# Patient Record
Sex: MALE | Race: WHITE | NOT HISPANIC OR LATINO | Employment: OTHER | URBAN - METROPOLITAN AREA
[De-identification: names, ages, dates, MRNs, and addresses within clinical notes are randomized per-mention and may not be internally consistent; named-entity substitution may affect disease eponyms.]

---

## 2023-09-06 ENCOUNTER — HOSPITAL ENCOUNTER (EMERGENCY)
Facility: CLINIC | Age: 28
Discharge: HOME | End: 2023-09-06
Attending: EMERGENCY MEDICINE | Admitting: EMERGENCY MEDICINE
Payer: COMMERCIAL

## 2023-09-06 ENCOUNTER — APPOINTMENT (OUTPATIENT)
Dept: GENERAL RADIOLOGY | Facility: CLINIC | Age: 28
End: 2023-09-06
Attending: EMERGENCY MEDICINE
Payer: COMMERCIAL

## 2023-09-06 VITALS
HEART RATE: 94 BPM | TEMPERATURE: 98.1 F | WEIGHT: 213.5 LBS | DIASTOLIC BLOOD PRESSURE: 80 MMHG | RESPIRATION RATE: 16 BRPM | SYSTOLIC BLOOD PRESSURE: 117 MMHG | OXYGEN SATURATION: 100 %

## 2023-09-06 DIAGNOSIS — J20.9 ACUTE BRONCHITIS, UNSPECIFIED ORGANISM: ICD-10-CM

## 2023-09-06 PROCEDURE — 71045 X-RAY EXAM CHEST 1 VIEW: CPT

## 2023-09-06 PROCEDURE — 99283 EMERGENCY DEPT VISIT LOW MDM: CPT | Performed by: EMERGENCY MEDICINE

## 2023-09-06 PROCEDURE — 99283 EMERGENCY DEPT VISIT LOW MDM: CPT | Mod: 25 | Performed by: EMERGENCY MEDICINE

## 2023-09-06 ASSESSMENT — ACTIVITIES OF DAILY LIVING (ADL): ADLS_ACUITY_SCORE: 35

## 2023-09-06 NOTE — LETTER
September 6, 2023      To Whom It May Concern:      Rufus Cordova was seen in our Emergency Department today, 09/06/23.   He may return to work tomorrow.       Sincerely,        Adalberto Mojica MD

## 2023-09-06 NOTE — ED PROVIDER NOTES
History     Chief Complaint   Patient presents with    Cough     HPI  Rufus Cordova is a 28 year old male who presents with a cough.  This began 1 week ago.  No fever.  Feels some congestion.  No dyspnea.  No history of chronic lung disease.  Is a smoker    Allergies:  No Known Allergies    Problem List:    There are no problems to display for this patient.       Past Medical History:    No past medical history on file.    Past Surgical History:    No past surgical history on file.    Family History:    No family history on file.    Social History:  Marital Status:  Single [1]        Medications:    No current outpatient medications on file.        Review of Systems  All other systems are reviewed and are negative    Physical Exam   BP: 117/80  Pulse: 94  Temp: 98.1  F (36.7  C)  Resp: 16  Weight: 96.8 kg (213 lb 8 oz)  SpO2: 100 %      Physical Exam  Vitals and nursing note reviewed.   Constitutional:       General: He is not in acute distress.     Appearance: He is well-developed. He is not diaphoretic.   HENT:      Head: Normocephalic and atraumatic.      Nose: Nose normal.      Mouth/Throat:      Mouth: Mucous membranes are moist.      Pharynx: Oropharynx is clear.   Eyes:      General: No scleral icterus.        Right eye: No discharge.         Left eye: No discharge.      Conjunctiva/sclera: Conjunctivae normal.   Cardiovascular:      Rate and Rhythm: Normal rate and regular rhythm.      Heart sounds: Normal heart sounds. No murmur heard.  Pulmonary:      Effort: Pulmonary effort is normal. No respiratory distress.      Breath sounds: Normal breath sounds. No stridor.   Abdominal:      General: There is no distension.      Palpations: Abdomen is soft.      Tenderness: There is no abdominal tenderness. There is no guarding or rebound.   Musculoskeletal:         General: Normal range of motion.      Cervical back: Normal range of motion and neck supple.   Skin:     General: Skin is warm and dry.       Coloration: Skin is not pale.      Findings: No erythema or rash.   Neurological:      General: No focal deficit present.      Mental Status: He is alert.      Cranial Nerves: No cranial nerve deficit.      Motor: No abnormal muscle tone.   Psychiatric:         Mood and Affect: Mood normal.         ED Course                 Procedures              Results for orders placed or performed during the hospital encounter of 09/06/23 (from the past 24 hour(s))   XR Chest Port 1 View    Narrative    XR CHEST PORT 1 VIEW 9/6/2023 3:14 PM    HISTORY: cough    COMPARISON: None.      Impression    IMPRESSION: No infiltrate, pleural effusion or pneumothorax. Small  calcified granuloma in the mid right lung. Normal heart size.    BRISEIDA PAULINO MD         SYSTEM ID:  [id number]       Medications - No data to display    Assessments & Plan (with Medical Decision Making)  28-year-old male with a cough over the last week.  Chest x-ray negative.  Appears viral.  Symptomatic care advised,     I have reviewed the nursing notes.    I have reviewed the findings, diagnosis, plan and need for follow up with the patient.          New Prescriptions    No medications on file       Final diagnoses:   Acute bronchitis, unspecified organism       9/6/2023   [facility] EMERGENCY DEPT       Adalberto Mojica MD  09/06/23 2312

## 2023-09-06 NOTE — ED TRIAGE NOTES
Pt presents with cough, runny nose chest fullness and chest pian with inspiration. Started one week ago. Slight headache.      Triage Assessment       Row Name 09/06/23 5785       Triage Assessment (Adult)    Airway WDL WDL       Respiratory WDL    Respiratory WDL X  Cough       Skin Circulation/Temperature WDL    Skin Circulation/Temperature WDL WDL       Cardiac WDL    Cardiac WDL X  Chest fullness.       Peripheral/Neurovascular WDL    Peripheral Neurovascular WDL WDL       Cognitive/Neuro/Behavioral WDL    Cognitive/Neuro/Behavioral WDL WDL

## 2024-04-29 ENCOUNTER — APPOINTMENT (OUTPATIENT)
Dept: GENERAL RADIOLOGY | Facility: CLINIC | Age: 29
End: 2024-04-29
Attending: STUDENT IN AN ORGANIZED HEALTH CARE EDUCATION/TRAINING PROGRAM

## 2024-04-29 ENCOUNTER — HOSPITAL ENCOUNTER (EMERGENCY)
Facility: CLINIC | Age: 29
Discharge: HOME | End: 2024-04-29
Attending: STUDENT IN AN ORGANIZED HEALTH CARE EDUCATION/TRAINING PROGRAM | Admitting: STUDENT IN AN ORGANIZED HEALTH CARE EDUCATION/TRAINING PROGRAM

## 2024-04-29 VITALS
SYSTOLIC BLOOD PRESSURE: 104 MMHG | OXYGEN SATURATION: 95 % | RESPIRATION RATE: 22 BRPM | DIASTOLIC BLOOD PRESSURE: 70 MMHG | HEART RATE: 102 BPM | TEMPERATURE: 97.5 F

## 2024-04-29 DIAGNOSIS — R10.30 LOWER ABDOMINAL PAIN: ICD-10-CM

## 2024-04-29 PROCEDURE — 99283 EMERGENCY DEPT VISIT LOW MDM: CPT | Performed by: STUDENT IN AN ORGANIZED HEALTH CARE EDUCATION/TRAINING PROGRAM

## 2024-04-29 PROCEDURE — 74019 RADEX ABDOMEN 2 VIEWS: CPT

## 2024-04-29 ASSESSMENT — COLUMBIA-SUICIDE SEVERITY RATING SCALE - C-SSRS
1. IN THE PAST MONTH, HAVE YOU WISHED YOU WERE DEAD OR WISHED YOU COULD GO TO SLEEP AND NOT WAKE UP?: NO
6. HAVE YOU EVER DONE ANYTHING, STARTED TO DO ANYTHING, OR PREPARED TO DO ANYTHING TO END YOUR LIFE?: NO
2. HAVE YOU ACTUALLY HAD ANY THOUGHTS OF KILLING YOURSELF IN THE PAST MONTH?: NO

## 2024-04-29 ASSESSMENT — ACTIVITIES OF DAILY LIVING (ADL)
ADLS_ACUITY_SCORE: 35
ADLS_ACUITY_SCORE: 35

## 2024-04-29 NOTE — Clinical Note
Rufus Cordova was seen and treated in our emergency department on 4/29/2024.  He may return to work on 05/01/2024.       If you have any questions or concerns, please don't hesitate to call.      Robinson Felipe MD

## 2024-04-30 NOTE — ED NOTES
PT states that he don't have a ride and cannot wait for the reports. PT states that his co worker who brought him here is the one waiting outside.

## 2024-04-30 NOTE — DISCHARGE INSTRUCTIONS
The cause of your pain is still very unclear.  If I had to guess, I think you are probably dealing with constipation and gas.  We discussed performing a few different tests including blood work and urine testing today, but since he feels so much better, I do think it is reasonable to start with the x-ray alone.  Results of the study are still pending.  I will contact you with any concerning results.  I think it would be reasonable to start using MiraLAX to help with constipation.  Start with a capful daily and increase by a capful each day until you are having regular/soft bowel movements.  Follow-up with your primary care doctor for recheck.  Return to the emergency department immediately in the meantime for return/worsened pain, nausea/vomiting, fevers, other new or concerning symptoms.

## 2024-04-30 NOTE — ED PROVIDER NOTES
History     Chief Complaint   Patient presents with    Abdominal Pain     Abdominal pain that started today     HPI  Rufus Cordova is a 29 year old male with no relevant medical history who presents for evaluation of abdominal pain.  Patient reports progressively worsening left lower quadrant abdominal pain since this morning.  This started without obvious injury or strain.  He did note having a very large/hard bowel movement about a week ago.  There was a tiny amount of blood on the tissue paper at that time, but none since.  He has had a few small bowel movements since then, but still feels backed up.  Patient otherwise feels well, denies any associated fever, chills, nausea or vomiting, other changes in bowel or urinary habits, other complaints today.    Allergies:  No Known Allergies    Problem List:    There are no problems to display for this patient.     Past Medical History:    Denies.    Past Surgical History:    No past surgical history on file.    Family History:    No family history on file.    Social History:  Marital Status:  Single [1]        Medications:    No current outpatient medications on file.      Review of Systems   All other systems reviewed and are negative.  See HPI.    Physical Exam   BP: 106/70  Pulse: 102  Temp: 97.5  F (36.4  C)  Resp: 22  SpO2: 94 %    Physical Exam  Vitals and nursing note reviewed.   Constitutional:       General: He is not in acute distress.     Appearance: Normal appearance. He is not toxic-appearing.      Comments: Anxious, otherwise nontoxic.  Sitting comfortably on exam bed.   HENT:      Head: Atraumatic.   Eyes:      General: No scleral icterus.     Conjunctiva/sclera: Conjunctivae normal.   Cardiovascular:      Rate and Rhythm: Normal rate.      Heart sounds: Normal heart sounds.   Pulmonary:      Effort: Pulmonary effort is normal. No respiratory distress.      Breath sounds: Normal breath sounds.   Abdominal:      General: Abdomen is flat. There is no  distension.      Palpations: Abdomen is soft.      Tenderness: There is abdominal tenderness in the suprapubic area.      Comments: Patient had minimal if any suprapubic/LLQ tenderness with no rebound or guarding.  No CVA tenderness.   Musculoskeletal:         General: No deformity.      Cervical back: Neck supple.   Skin:     General: Skin is warm.      Capillary Refill: Capillary refill takes less than 2 seconds.      Coloration: Skin is not pale.   Neurological:      General: No focal deficit present.      Mental Status: He is alert and oriented to person, place, and time.   Psychiatric:         Mood and Affect: Mood is anxious.         ED Course        Procedures              Results for orders placed or performed during the hospital encounter of 04/29/24 (from the past 24 hour(s))   XR Abdomen 2 Views    Narrative    EXAM: XR ABDOMEN 2 VIEWS  LOCATION: [facility]  DATE: 4/29/2024    INDICATION: Left lower quadrant suprapubic pain, relieved with passing gas  COMPARISON: None.      Impression    IMPRESSION: Negative abdomen. Bowel gas pattern is normal. Nothing for obstruction or free air. No evidence for renal stones.       Medications - No data to display    Assessments & Plan (with Medical Decision Making)     I have reviewed the nursing notes.    I have reviewed the findings, diagnosis, plan and need for follow up with the patient.  Medical Decision Making  Rufus Cordova is a 29 year old male with no relevant medical history who presents for evaluation of abdominal pain.  Patient was slightly tachycardic but also quite anxious appearing.  His pain had improved dramatically after passing gas shortly after arrival and he was essentially nontender on exam.  Since he was in severe pain earlier and still had some mild suprapubic/left lower quadrant tenderness, I did recommend obtaining at least a urinalysis and considering some blood work.  However, since he felt so significantly  improved he was only interested in pursuing an x-ray due to history of constipation and passing a very large/hard bowel movement last week.  Even before the x-ray had resulted, patient requested discharge paperwork because he felt back to baseline and his ride was here.  The x-ray later returned without any acute findings.  Again discussed that his symptoms are probably due to gas, but that it is very difficult to rule out other causes without additional testing.  He expressed understanding of these limitations and will continue to monitor symptoms very closely at home.  I think it would be reasonable to start daily MiraLAX to help with more frequent/softer bowel movements.  Advised PCP follow-up and he agrees to return sooner for any new or acutely worsening symptoms.    There are no discharge medications for this patient.      Final diagnoses:   Lower abdominal pain       4/29/2024   [facility] EMERGENCY DEPT       Robinson Felipe MD  04/30/24 0209

## 2024-04-30 NOTE — ED TRIAGE NOTES
Patient presents with left side abdominal pain that started today and has worsened since 2030 tonight. Denies nausea, vomiting, diarrhea.      Triage Assessment (Adult)       Row Name 04/29/24 2200          Triage Assessment    Airway WDL WDL        Respiratory WDL    Respiratory WDL WDL        Cardiac WDL    Cardiac WDL X;rhythm     Pulse Rate & Regularity tachycardic        Peripheral/Neurovascular WDL    Peripheral Neurovascular WDL WDL        Cognitive/Neuro/Behavioral WDL    Cognitive/Neuro/Behavioral WDL WDL

## 2024-08-26 ENCOUNTER — TELEPHONE (OUTPATIENT)
Dept: FAMILY MEDICINE | Facility: OTHER | Age: 29
End: 2024-08-26

## 2024-08-26 ENCOUNTER — HOSPITAL ENCOUNTER (EMERGENCY)
Facility: CLINIC | Age: 29
Discharge: HOME | End: 2024-08-26
Attending: FAMILY MEDICINE | Admitting: FAMILY MEDICINE

## 2024-08-26 ENCOUNTER — APPOINTMENT (OUTPATIENT)
Dept: GENERAL RADIOLOGY | Facility: CLINIC | Age: 29
End: 2024-08-26
Attending: FAMILY MEDICINE

## 2024-08-26 VITALS
WEIGHT: 165 LBS | OXYGEN SATURATION: 99 % | BODY MASS INDEX: 28.17 KG/M2 | TEMPERATURE: 98.2 F | DIASTOLIC BLOOD PRESSURE: 67 MMHG | SYSTOLIC BLOOD PRESSURE: 110 MMHG | HEIGHT: 64 IN | RESPIRATION RATE: 18 BRPM | HEART RATE: 80 BPM

## 2024-08-26 DIAGNOSIS — S83.92XA SPRAIN OF LEFT KNEE, UNSPECIFIED LIGAMENT, INITIAL ENCOUNTER: ICD-10-CM

## 2024-08-26 PROCEDURE — 73562 X-RAY EXAM OF KNEE 3: CPT | Mod: LT

## 2024-08-26 PROCEDURE — 99284 EMERGENCY DEPT VISIT MOD MDM: CPT | Mod: 25 | Performed by: FAMILY MEDICINE

## 2024-08-26 PROCEDURE — 250N000011 HC RX IP 250 OP 636: Performed by: FAMILY MEDICINE

## 2024-08-26 PROCEDURE — 96372 THER/PROPH/DIAG INJ SC/IM: CPT | Performed by: FAMILY MEDICINE

## 2024-08-26 PROCEDURE — 99284 EMERGENCY DEPT VISIT MOD MDM: CPT | Performed by: FAMILY MEDICINE

## 2024-08-26 RX ORDER — KETOROLAC TROMETHAMINE 30 MG/ML
60 INJECTION, SOLUTION INTRAMUSCULAR; INTRAVENOUS ONCE
Status: COMPLETED | OUTPATIENT
Start: 2024-08-26 | End: 2024-08-26

## 2024-08-26 RX ADMIN — KETOROLAC TROMETHAMINE 60 MG: 30 INJECTION, SOLUTION INTRAMUSCULAR at 01:26

## 2024-08-26 ASSESSMENT — ACTIVITIES OF DAILY LIVING (ADL): ADLS_ACUITY_SCORE: 37

## 2024-08-26 NOTE — ED TRIAGE NOTES
"Pt states \"tweaked\" his left knee tonight, by hyperextending it. Denies any falls or other trauma to precipitate this. Hx of ACL tear and surgery 15+ years ago.     Triage Assessment (Adult)       Row Name 08/26/24 0115          Triage Assessment    Airway WDL WDL        Respiratory WDL    Respiratory WDL WDL        Skin Circulation/Temperature WDL    Skin Circulation/Temperature WDL WDL        Cardiac WDL    Cardiac WDL WDL        Peripheral/Neurovascular WDL    Peripheral Neurovascular WDL WDL        Cognitive/Neuro/Behavioral WDL    Cognitive/Neuro/Behavioral WDL WDL                     "

## 2024-08-26 NOTE — TELEPHONE ENCOUNTER
Patient calling as he is frustrated he left the ED without pain medications. He tore his ACL and needs something for pain. Informed him that there is noting we can do here. No mention of pain medication in ED note. Patient will go back to ED if he needs pain meds  Ananya Hairston RN on 8/26/2024 at 4:55 PM

## 2024-08-26 NOTE — ED PROVIDER NOTES
"  History     Chief Complaint   Patient presents with    Knee Pain     HPI  Rufus Cordova is a 29 year old male who presents with left knee pain.  Patient states he was walking when his knee just kind of shifted and had a pop.  This happened about 4 hours ago and since then has been having worsening pain.  Patient's had a previous ACL tear and repair.  States like after the surgery it tore again but never had it repaired again.  His knee seems to pop in and out from time to time.  Tonight is just worse.  Nothing seems to make the pain better or worse.  There was no trauma tonight.    Allergies:  No Known Allergies    Problem List:    There are no problems to display for this patient.       Past Medical History:    No past medical history on file.    Past Surgical History:    No past surgical history on file.    Family History:    No family history on file.    Social History:  Marital Status:  Single [1]        Medications:    No current outpatient medications on file.        Review of Systems   All other systems reviewed and are negative.      Physical Exam   BP: 110/67  Pulse: 80  Temp: 98.2  F (36.8  C)  Resp: 18  Height: 162.6 cm (5' 4\")  Weight: 74.8 kg (165 lb)  SpO2: 99 %      Physical Exam  Vitals and nursing note reviewed.   Constitutional:       General: He is not in acute distress.     Appearance: Normal appearance. He is not ill-appearing.   Cardiovascular:      Pulses: Normal pulses.   Musculoskeletal:      Left knee: No swelling, deformity, effusion, erythema, ecchymosis or lacerations. Decreased range of motion. Tenderness present. Normal alignment and normal meniscus.   Skin:     General: Skin is warm and dry.      Capillary Refill: Capillary refill takes less than 2 seconds.   Neurological:      Mental Status: He is alert.         ED Course        Procedures                Results for orders placed or performed during the hospital encounter of 08/26/24 (from the past 24 hour(s))   XR Knee Left 3 " Views    Narrative    EXAM: LEFT KNEE 4 VIEWS  LOCATION: [facility]  DATE/TIME: 8/26/2024 1:52 AM CDT    INDICATION: Fall. Twisting injury.  COMPARISON: None.      Impression    IMPRESSION:   1. No visualized acute fracture or malalignment of the left knee.  2. Surgical hardware in the distal femur and proximal tibia, likely related to prior ligament repair.   3. No knee joint effusion.       Medications   ketorolac (TORADOL) injection 60 mg (60 mg Intramuscular $Given 8/26/24 0126)     X-ray of the knee was unremarkable.  With patient having an unstable knee, patient certainly could have some type of ligamentous injury or cartilage injury.  Recommend doing an Ace wrap on the knee, patient did not tolerate a knee immobilizer as it was more painful to keep his knee straight.  Patient was given crutches to use as needed.  Referral was placed to orthopedics.  Patient will be discharged at this time.    Assessments & Plan (with Medical Decision Making)  Left knee pain     I have reviewed the nursing notes.    I have reviewed the findings, diagnosis, plan and need for follow up with the patient.      There are no discharge medications for this patient.      Final diagnoses:   Sprain of left knee, unspecified ligament, initial encounter       8/26/2024   [facility] EMERGENCY DEPT       Darian Robins MD  08/26/24 2615

## 2024-08-29 ENCOUNTER — HOSPITAL ENCOUNTER (EMERGENCY)
Facility: CLINIC | Age: 29
Discharge: HOME | End: 2024-08-29
Attending: EMERGENCY MEDICINE | Admitting: EMERGENCY MEDICINE

## 2024-08-29 VITALS
DIASTOLIC BLOOD PRESSURE: 82 MMHG | TEMPERATURE: 98.4 F | HEART RATE: 112 BPM | BODY MASS INDEX: 28.32 KG/M2 | OXYGEN SATURATION: 100 % | RESPIRATION RATE: 18 BRPM | SYSTOLIC BLOOD PRESSURE: 122 MMHG | WEIGHT: 165 LBS

## 2024-08-29 DIAGNOSIS — S89.92XD LEFT KNEE INJURY, SUBSEQUENT ENCOUNTER: ICD-10-CM

## 2024-08-29 PROCEDURE — 99283 EMERGENCY DEPT VISIT LOW MDM: CPT | Performed by: EMERGENCY MEDICINE

## 2024-08-29 RX ORDER — HYDROCODONE BITARTRATE AND ACETAMINOPHEN 5; 325 MG/1; MG/1
1 TABLET ORAL EVERY 6 HOURS PRN
Qty: 12 TABLET | Refills: 0 | Status: SHIPPED | OUTPATIENT
Start: 2024-08-29 | End: 2024-09-01

## 2024-08-29 ASSESSMENT — ACTIVITIES OF DAILY LIVING (ADL): ADLS_ACUITY_SCORE: 33

## 2024-08-29 ASSESSMENT — COLUMBIA-SUICIDE SEVERITY RATING SCALE - C-SSRS
6. HAVE YOU EVER DONE ANYTHING, STARTED TO DO ANYTHING, OR PREPARED TO DO ANYTHING TO END YOUR LIFE?: NO
1. IN THE PAST MONTH, HAVE YOU WISHED YOU WERE DEAD OR WISHED YOU COULD GO TO SLEEP AND NOT WAKE UP?: NO
2. HAVE YOU ACTUALLY HAD ANY THOUGHTS OF KILLING YOURSELF IN THE PAST MONTH?: NO

## 2024-08-29 NOTE — ED TRIAGE NOTES
Pt here for pain medications for his knee pain         Triage Assessment (Adult)       Row Name 08/29/24 1812          Triage Assessment    Airway WDL WDL        Respiratory WDL    Respiratory WDL WDL

## 2024-08-30 NOTE — DISCHARGE INSTRUCTIONS
No driving if taking any pain pills.  Use caution with them as it can make you dizzy.  Take them as needed for severe pain and to help you sleep.  Follow-up with orthopedics.  I hope you get this taken care of quickly.  It was a pleasure to meet you.

## 2024-08-30 NOTE — ED PROVIDER NOTES
History     Chief Complaint   Patient presents with    Knee Pain     HPI  Rufus Cordova is a 29 year old male who presents to the emergency department secondary to wanting pain pills.  He was seen here a couple of days ago after a left knee injury.  He previously had his ACL repaired in the 10th grade and that he injured it again and it felt the same.  He has had a lot of swelling throbbing discomfort and pain making it difficult to sleep.  He has been using ibuprofen and Tylenol which does not cut it very well for getting rest at nighttime.  He is planning on following up with orthopedics but cannot get in for 2 to 3 weeks.  He has been elevating and icing is much as possible.  He does not want something as strong as oxycodone.  He has tolerated hydrocodone in the past.  He has not had any adverse effects with it.    Allergies:  No Known Allergies    Problem List:    There are no problems to display for this patient.       Past Medical History:    No past medical history on file.    Past Surgical History:    No past surgical history on file.    Family History:    No family history on file.    Social History:  Marital Status:  Single [1]        Medications:    HYDROcodone-acetaminophen (NORCO) 5-325 MG tablet          Review of Systems   All other systems reviewed and are negative.      Physical Exam   BP: 122/82  Pulse: 112  Temp: 98.4  F (36.9  C)  Resp: 18  Weight: 74.8 kg (165 lb)  SpO2: 100 %      Physical Exam  Vitals and nursing note reviewed.   Constitutional:       General: He is not in acute distress.     Appearance: He is well-developed. He is not diaphoretic.   HENT:      Head: Normocephalic and atraumatic.      Nose: Nose normal. No congestion.      Mouth/Throat:      Mouth: Mucous membranes are moist.   Eyes:      General: No scleral icterus.     Extraocular Movements: Extraocular movements intact.      Conjunctiva/sclera: Conjunctivae normal.   Cardiovascular:      Rate and Rhythm: Normal rate.    Pulmonary:      Effort: Pulmonary effort is normal.   Abdominal:      General: Abdomen is flat.   Musculoskeletal:      Cervical back: Normal range of motion and neck supple.      Comments: Patient's left knee was wrapped in Ace wrap and.  Swollen I did not palpate the knee.   Lymphadenopathy:      Cervical: No cervical adenopathy.   Skin:     General: Skin is warm and dry.      Findings: No rash.   Neurological:      General: No focal deficit present.      Mental Status: He is alert.         ED Course        Procedures             No results found for this or any previous visit (from the past 24 hour(s)).    Medications - No data to display    Assessments & Plan (with Medical Decision Making)  Left knee injury with ongoing pain and difficulty sleeping.  Patient has a history of ACL injury and this feels the same.  He has not used any narcotics in the past but does not want something heavy.  He was comfortable trying Norco.  He has tolerated that well in the past.  A prescription was sent to the pharmacy.  Patient was advised using these medications and advised on follow-up.  Return to ER precautions and follow-up precautions discussed.  All questions answered prior to discharge.     I have reviewed the nursing notes.    I have reviewed the findings, diagnosis, plan and need for follow up with the patient.                Discharge Medication List as of 8/29/2024  7:20 PM        START taking these medications    Details   HYDROcodone-acetaminophen (NORCO) 5-325 MG tablet Take 1 tablet by mouth every 6 hours as needed for severe pain., Disp-12 tablet, R-0, E-Prescribe             Final diagnoses:   Left knee injury, subsequent encounter       8/29/2024   [facility] EMERGENCY DEPT       Joni Rasmussen MD  08/29/24 2438

## 2024-09-09 ENCOUNTER — APPOINTMENT (OUTPATIENT)
Dept: MRI IMAGING | Facility: CLINIC | Age: 29
End: 2024-09-09
Attending: STUDENT IN AN ORGANIZED HEALTH CARE EDUCATION/TRAINING PROGRAM

## 2024-09-09 ENCOUNTER — APPOINTMENT (OUTPATIENT)
Dept: GENERAL RADIOLOGY | Facility: CLINIC | Age: 29
End: 2024-09-09
Attending: STUDENT IN AN ORGANIZED HEALTH CARE EDUCATION/TRAINING PROGRAM

## 2024-09-09 ENCOUNTER — HOSPITAL ENCOUNTER (EMERGENCY)
Facility: CLINIC | Age: 29
Discharge: HOME | End: 2024-09-09
Attending: STUDENT IN AN ORGANIZED HEALTH CARE EDUCATION/TRAINING PROGRAM | Admitting: STUDENT IN AN ORGANIZED HEALTH CARE EDUCATION/TRAINING PROGRAM

## 2024-09-09 VITALS
SYSTOLIC BLOOD PRESSURE: 105 MMHG | DIASTOLIC BLOOD PRESSURE: 72 MMHG | HEART RATE: 82 BPM | BODY MASS INDEX: 26.8 KG/M2 | WEIGHT: 157 LBS | OXYGEN SATURATION: 98 % | HEIGHT: 64 IN | RESPIRATION RATE: 18 BRPM | TEMPERATURE: 97.8 F

## 2024-09-09 DIAGNOSIS — M25.562 ACUTE PAIN OF LEFT KNEE: ICD-10-CM

## 2024-09-09 DIAGNOSIS — S83.512A RUPTURE OF ANTERIOR CRUCIATE LIGAMENT OF LEFT KNEE, INITIAL ENCOUNTER: ICD-10-CM

## 2024-09-09 LAB
ALBUMIN SERPL BCG-MCNC: 3.5 G/DL (ref 3.5–5.2)
ALP SERPL-CCNC: 78 U/L (ref 40–150)
ALT SERPL W P-5'-P-CCNC: 19 U/L (ref 0–70)
ANION GAP SERPL CALCULATED.3IONS-SCNC: 9 MMOL/L (ref 7–15)
AST SERPL W P-5'-P-CCNC: 20 U/L (ref 0–45)
BASOPHILS # BLD AUTO: 0.1 10E3/UL (ref 0–0.2)
BASOPHILS NFR BLD AUTO: 1 %
BILIRUB SERPL-MCNC: 0.3 MG/DL
BUN SERPL-MCNC: 10.4 MG/DL (ref 6–20)
CALCIUM SERPL-MCNC: 8.3 MG/DL (ref 8.8–10.4)
CHLORIDE SERPL-SCNC: 102 MMOL/L (ref 98–107)
CREAT SERPL-MCNC: 0.83 MG/DL (ref 0.67–1.17)
CRP SERPL-MCNC: 3.2 MG/L
D DIMER PPP FEU-MCNC: <0.27 UG/ML FEU (ref 0–0.5)
EGFRCR SERPLBLD CKD-EPI 2021: >90 ML/MIN/1.73M2
EOSINOPHIL # BLD AUTO: 0.3 10E3/UL (ref 0–0.7)
EOSINOPHIL NFR BLD AUTO: 2 %
ERYTHROCYTE [DISTWIDTH] IN BLOOD BY AUTOMATED COUNT: 13 % (ref 10–15)
ERYTHROCYTE [SEDIMENTATION RATE] IN BLOOD BY WESTERGREN METHOD: 2 MM/HR (ref 0–15)
GLUCOSE SERPL-MCNC: 90 MG/DL (ref 70–99)
HCO3 SERPL-SCNC: 26 MMOL/L (ref 22–29)
HCT VFR BLD AUTO: 42.6 % (ref 40–53)
HGB BLD-MCNC: 14.6 G/DL (ref 13.3–17.7)
IMM GRANULOCYTES # BLD: 0.2 10E3/UL
IMM GRANULOCYTES NFR BLD: 1 %
LYMPHOCYTES # BLD AUTO: 1.6 10E3/UL (ref 0.8–5.3)
LYMPHOCYTES NFR BLD AUTO: 12 %
MCH RBC QN AUTO: 32 PG (ref 26.5–33)
MCHC RBC AUTO-ENTMCNC: 34.3 G/DL (ref 31.5–36.5)
MCV RBC AUTO: 93 FL (ref 78–100)
MONOCYTES # BLD AUTO: 1 10E3/UL (ref 0–1.3)
MONOCYTES NFR BLD AUTO: 8 %
NEUTROPHILS # BLD AUTO: 10.5 10E3/UL (ref 1.6–8.3)
NEUTROPHILS NFR BLD AUTO: 77 %
NRBC # BLD AUTO: 0 10E3/UL
NRBC BLD AUTO-RTO: 0 /100
PLATELET # BLD AUTO: 331 10E3/UL (ref 150–450)
POTASSIUM SERPL-SCNC: 3.9 MMOL/L (ref 3.4–5.3)
PROT SERPL-MCNC: 5.9 G/DL (ref 6.4–8.3)
RBC # BLD AUTO: 4.56 10E6/UL (ref 4.4–5.9)
SODIUM SERPL-SCNC: 137 MMOL/L (ref 135–145)
WBC # BLD AUTO: 13.5 10E3/UL (ref 4–11)

## 2024-09-09 PROCEDURE — 99284 EMERGENCY DEPT VISIT MOD MDM: CPT | Mod: 25 | Performed by: STUDENT IN AN ORGANIZED HEALTH CARE EDUCATION/TRAINING PROGRAM

## 2024-09-09 PROCEDURE — 80053 COMPREHEN METABOLIC PANEL: CPT | Performed by: STUDENT IN AN ORGANIZED HEALTH CARE EDUCATION/TRAINING PROGRAM

## 2024-09-09 PROCEDURE — 29505 APPLICATION LONG LEG SPLINT: CPT | Mod: LT | Performed by: STUDENT IN AN ORGANIZED HEALTH CARE EDUCATION/TRAINING PROGRAM

## 2024-09-09 PROCEDURE — 85025 COMPLETE CBC W/AUTO DIFF WBC: CPT | Performed by: STUDENT IN AN ORGANIZED HEALTH CARE EDUCATION/TRAINING PROGRAM

## 2024-09-09 PROCEDURE — 258N000003 HC RX IP 258 OP 636: Performed by: STUDENT IN AN ORGANIZED HEALTH CARE EDUCATION/TRAINING PROGRAM

## 2024-09-09 PROCEDURE — 73721 MRI JNT OF LWR EXTRE W/O DYE: CPT | Mod: LT

## 2024-09-09 PROCEDURE — 86140 C-REACTIVE PROTEIN: CPT | Performed by: STUDENT IN AN ORGANIZED HEALTH CARE EDUCATION/TRAINING PROGRAM

## 2024-09-09 PROCEDURE — 36415 COLL VENOUS BLD VENIPUNCTURE: CPT | Performed by: STUDENT IN AN ORGANIZED HEALTH CARE EDUCATION/TRAINING PROGRAM

## 2024-09-09 PROCEDURE — 96360 HYDRATION IV INFUSION INIT: CPT | Performed by: STUDENT IN AN ORGANIZED HEALTH CARE EDUCATION/TRAINING PROGRAM

## 2024-09-09 PROCEDURE — 250N000013 HC RX MED GY IP 250 OP 250 PS 637: Performed by: STUDENT IN AN ORGANIZED HEALTH CARE EDUCATION/TRAINING PROGRAM

## 2024-09-09 PROCEDURE — 73562 X-RAY EXAM OF KNEE 3: CPT | Mod: LT

## 2024-09-09 PROCEDURE — 93005 ELECTROCARDIOGRAM TRACING: CPT | Performed by: STUDENT IN AN ORGANIZED HEALTH CARE EDUCATION/TRAINING PROGRAM

## 2024-09-09 PROCEDURE — 85652 RBC SED RATE AUTOMATED: CPT | Performed by: STUDENT IN AN ORGANIZED HEALTH CARE EDUCATION/TRAINING PROGRAM

## 2024-09-09 PROCEDURE — 85379 FIBRIN DEGRADATION QUANT: CPT | Performed by: STUDENT IN AN ORGANIZED HEALTH CARE EDUCATION/TRAINING PROGRAM

## 2024-09-09 RX ORDER — HYDROCODONE BITARTRATE AND ACETAMINOPHEN 5; 325 MG/1; MG/1
1 TABLET ORAL ONCE
Status: COMPLETED | OUTPATIENT
Start: 2024-09-09 | End: 2024-09-09

## 2024-09-09 RX ORDER — OXYCODONE HYDROCHLORIDE 5 MG/1
5 TABLET ORAL EVERY 6 HOURS PRN
Qty: 12 TABLET | Refills: 0 | Status: SHIPPED | OUTPATIENT
Start: 2024-09-09 | End: 2024-09-12

## 2024-09-09 RX ADMIN — HYDROCODONE BITARTRATE AND ACETAMINOPHEN 1 TABLET: 5; 325 TABLET ORAL at 17:17

## 2024-09-09 RX ADMIN — SODIUM CHLORIDE 1000 ML: 9 INJECTION, SOLUTION INTRAVENOUS at 15:12

## 2024-09-09 RX ADMIN — SODIUM CHLORIDE 1000 ML: 9 INJECTION, SOLUTION INTRAVENOUS at 15:56

## 2024-09-09 ASSESSMENT — ACTIVITIES OF DAILY LIVING (ADL)
ADLS_ACUITY_SCORE: 37

## 2024-09-09 ASSESSMENT — COLUMBIA-SUICIDE SEVERITY RATING SCALE - C-SSRS
2. HAVE YOU ACTUALLY HAD ANY THOUGHTS OF KILLING YOURSELF IN THE PAST MONTH?: NO
6. HAVE YOU EVER DONE ANYTHING, STARTED TO DO ANYTHING, OR PREPARED TO DO ANYTHING TO END YOUR LIFE?: NO
1. IN THE PAST MONTH, HAVE YOU WISHED YOU WERE DEAD OR WISHED YOU COULD GO TO SLEEP AND NOT WAKE UP?: NO

## 2024-09-09 NOTE — ED PROVIDER NOTES
"  History     Chief Complaint   Patient presents with    Hypotension    Syncope     HPI  Rufus Cordova is a 29 year old male with no relevant medical history who presents for evaluation of left knee pain and a syncopal episode.  Patient originally injured his left knee and was seen in the emergency department on 8/26.  He describes having a previous ACL injury and surgical repair on this knee years ago.  X-ray that day showed no obvious fracture or effusion.  Pain improved with Toradol and supportive cares were recommended.  He then came back to the emergency department on 8/29 for worsened pain and was given a prescription for Norco.  The pain to his knee had largely resolved over the next several days.  Then this morning when getting out of bed he describes again feeling a \"pop\".  He has had significant pain to the knee ever since.  Upon trying to get out of bed a second time this afternoon, he felt a flare of pain and immediately felt lightheaded after that.  He had a brief syncopal episode, but did not hit his head or sustain any other injuries when lowering himself to the ground.  For this complaint he called EMS.  Systolic BP was in the 70s, but improved to around 100 after 500 cc of fluids.  Glucose was 88.  Aside from continued pain to the left knee, patient denies any recent infectious symptoms such as fever or chills.  P.o. intake has been a bit decreased with limited mobility, but he has not had any nausea or vomiting.  Also denies any chest pain, shortness of breath, headache, focal numbness or weakness, other complaints.    Allergies:  No Known Allergies    Problem List:    There are no problems to display for this patient.     Past Medical History:    Denies.    Past Surgical History:    No past surgical history on file.    Family History:    No family history on file.    Social History:  Marital Status:  Single [1]      Medications:    oxyCODONE (ROXICODONE) 5 MG tablet      Review of Systems   All " "other systems reviewed and are negative.  See HPI.    Physical Exam   BP: 98/69  Pulse: 84  Temp: 97.8  F (36.6  C)  Resp: 19  Height: 162.6 cm (5' 4\")  Weight: 71.2 kg (157 lb)  SpO2: 100 %    Physical Exam  Vitals and nursing note reviewed.   Constitutional:       Appearance: Normal appearance. He is not toxic-appearing.      Comments: Patient is very anxious.  Appears a bit pale.  Otherwise no acute distress.   HENT:      Head: Normocephalic and atraumatic.      Comments: No signs of head or neck trauma.     Mouth/Throat:      Mouth: Mucous membranes are moist.   Eyes:      General: No scleral icterus.     Extraocular Movements: Extraocular movements intact.      Conjunctiva/sclera: Conjunctivae normal.      Pupils: Pupils are equal, round, and reactive to light.   Cardiovascular:      Rate and Rhythm: Normal rate and regular rhythm.      Pulses: Normal pulses.      Heart sounds: Normal heart sounds.   Pulmonary:      Effort: Pulmonary effort is normal. No respiratory distress.      Breath sounds: Normal breath sounds.   Abdominal:      Palpations: Abdomen is soft.      Tenderness: There is no abdominal tenderness.   Musculoskeletal:         General: Tenderness present. No swelling, deformity or signs of injury.      Cervical back: Normal range of motion and neck supple. No rigidity or tenderness.      Right lower leg: No edema.      Left lower leg: No edema.      Comments: Patient is sitting with the left leg externally rotated, describes having significant pain to the anterior knee with any movement or even adjusting his sweatpants.  There is moderate diffuse tenderness to the anterior aspect of the knee, but no obvious effusion or patellar dislocation.  No warmth or redness.  DP/PT pulses are 2+ on the left.  Compartments compressible.  There is no tenderness over the hip or ankle.   Skin:     General: Skin is warm.      Capillary Refill: Capillary refill takes less than 2 seconds.      Coloration: Skin is " pale.   Neurological:      General: No focal deficit present.      Mental Status: He is alert and oriented to person, place, and time.      Cranial Nerves: No cranial nerve deficit.      Sensory: No sensory deficit.      Motor: No weakness.      Coordination: Coordination normal.   Psychiatric:      Comments: Anxious.       ED Course     ED Course as of 09/09/24 2059   Mon Sep 09, 2024   1817 Patient continues to report excruciating left anterior knee pain over the patella and proximal tibia with any movements.  This is despite getting Norco.  There is no tenderness to the calf or thigh, but will obtain a DVT study since he has been largely bedbound for the past 10 days.  Will also obtain MRI of the knee and attempted to explain why an otherwise healthy 29-year-old is refusing to move the knee or even attempt bearing weight.   1829 I had originally ordered an ultrasound of the leg due to him being partially bedbound over the last week.  Unfortunately, the technician was traveling to another facility and this would have taken 2 to 3 hours.  Instead, obtained a D-dimer and this was completely negative.  With reassuring exam and this finding, I highly doubt DVT and do not think waiting for and ultrasound is necessary.   2037 MRI shows complete read tear of ACL graft.  I do think this is the cause of his new acute pain.  No other findings.  Case discussed with orthopedics, Dr. Jj.  Agreed with plans for knee immobilizer, crutches, oral pain meds, outpatient follow-up.     Procedures       EKG performed at 1517.  Sinus rhythm, rate 74.  Normal axis.  Normal intervals.  Nonspecific T wave changes.  Exam independently interpreted by me.       Results for orders placed or performed during the hospital encounter of 09/09/24 (from the past 24 hour(s))   CBC with platelets differential    Narrative    The following orders were created for panel order CBC with platelets differential.  Procedure                                Abnormality         Status                     ---------                               -----------         ------                     CBC with platelets and d...[[id number]]  Abnormal            Final result                 Please view results for these tests on the individual orders.   Comprehensive metabolic panel   Result Value Ref Range    Sodium 137 135 - 145 mmol/L    Potassium 3.9 3.4 - 5.3 mmol/L    Carbon Dioxide (CO2) 26 22 - 29 mmol/L    Anion Gap 9 7 - 15 mmol/L    Urea Nitrogen 10.4 6.0 - 20.0 mg/dL    Creatinine 0.83 0.67 - 1.17 mg/dL    GFR Estimate >90 >60 mL/min/1.73m2    Calcium 8.3 (L) 8.8 - 10.4 mg/dL    Chloride 102 98 - 107 mmol/L    Glucose 90 70 - 99 mg/dL    Alkaline Phosphatase 78 40 - 150 U/L    AST 20 0 - 45 U/L    ALT 19 0 - 70 U/L    Protein Total 5.9 (L) 6.4 - 8.3 g/dL    Albumin 3.5 3.5 - 5.2 g/dL    Bilirubin Total 0.3 <=1.2 mg/dL   CRP inflammation   Result Value Ref Range    CRP Inflammation 3.20 <5.00 mg/L   Erythrocyte sedimentation rate auto   Result Value Ref Range    Erythrocyte Sedimentation Rate 2 0 - 15 mm/hr   CBC with platelets and differential   Result Value Ref Range    WBC Count 13.5 (H) 4.0 - 11.0 10e3/uL    RBC Count 4.56 4.40 - 5.90 10e6/uL    Hemoglobin 14.6 13.3 - 17.7 g/dL    Hematocrit 42.6 40.0 - 53.0 %    MCV 93 78 - 100 fL    MCH 32.0 26.5 - 33.0 pg    MCHC 34.3 31.5 - 36.5 g/dL    RDW 13.0 10.0 - 15.0 %    Platelet Count 331 150 - 450 10e3/uL    % Neutrophils 77 %    % Lymphocytes 12 %    % Monocytes 8 %    % Eosinophils 2 %    % Basophils 1 %    % Immature Granulocytes 1 %    NRBCs per 100 WBC 0 <1 /100    Absolute Neutrophils 10.5 (H) 1.6 - 8.3 10e3/uL    Absolute Lymphocytes 1.6 0.8 - 5.3 10e3/uL    Absolute Monocytes 1.0 0.0 - 1.3 10e3/uL    Absolute Eosinophils 0.3 0.0 - 0.7 10e3/uL    Absolute Basophils 0.1 0.0 - 0.2 10e3/uL    Absolute Immature Granulocytes 0.2 <=0.4 10e3/uL    Absolute NRBCs 0.0 10e3/uL   XR Knee Left 3 Views    Narrative    LEFT KNEE  "THREE VIEWS  9/9/2024 3:27 PM    INDICATION: Use anterior tenderness, felt \"pop\" standing up out of  bed.    COMPARISON: 8/26/2024      Impression    IMPRESSION: Postop left knee ACL reconstruction, unchanged. The  interference screws remain in unchanged position. No change in the  chronic ossicle along the inferior left patella. Knee joint spaces are  unchanged and largely preserved. No acute left knee fracture or joint  malalignment. Joint spaces are normal. Small left knee joint effusion.    SABA WEINER MD         SYSTEM ID:  [id number]   D dimer quantitative   Result Value Ref Range    D-Dimer Quantitative <0.27 0.00 - 0.50 ug/mL FEU    Narrative    This D-dimer assay is intended for use in conjunction with a clinical pretest probability assessment model to exclude pulmonary embolism (PE) and deep venous thrombosis (DVT) in outpatients suspected of PE or DVT. The cut-off value is 0.50 ug/mL FEU.   MR Knee Left w/o Contrast    Narrative    EXAM: MR KNEE LEFT W/O CONTRAST  LOCATION: [facility]  DATE: 09/09/2024    INDICATION: Patient with severe anterior knee pain, over the patella and proximal tibia, unremarkable x-ray, but refuses to bear weight due to severity of pain.  COMPARISON: 09/09/2024  TECHNIQUE: Unenhanced.    FINDINGS:    MEDIAL COMPARTMENT:   -Meniscus: Normal.  -Cartilage: Regions of high-grade chondral loss along the medial femoral condyle with full-thickness chondral clefting and reactive underlying marrow edema (series 7 image 21).    LATERAL COMPARTMENT:  -Meniscus: Normal.   -Cartilage: Moderate diffuse chondral thinning. No focal full-thickness defect.    PATELLOFEMORAL COMPARTMENT:   -Alignment: Patella midline. No subluxation or tilting.   -Cartilage: Normal.    CRUCIATE LIGAMENTS:   -ACL: Changes consistent with prior ACL reconstruction. There has been complete re-tear of the ACL graft with no evidence of remaining intact fibers.  -PCL: " Normal.    COLLATERAL LIGAMENTS:   -Medial collateral ligament: Superficial and deep fibers are normal.  -Lateral collateral ligament: Normal.    POSTEROMEDIAL CORNER:  -Distal semimembranosus tendon is normal.   -Pes anserine tendons are normal. Posteromedial corner complex ligaments are intact.    POSTEROLATERAL CORNER:   -Popliteal tendon is intact. No tendinopathy.  -Biceps femoris tendon and posterolateral corner complex ligaments are intact.    EXTENSOR MECHANISM:   -Quadriceps tendon: Normal.  -Patellar tendon: Postoperative changes within the central patellar tendon suggestive of prior graft harvesting.  -Patellofemoral ligaments and retinacula: Intact.    JOINT:   -Small joint effusion.    BONES:  -No fracture or concerning marrow-replacing lesion.  -Graft fixation anchors within the tibia and femur creating local susceptibility artifact.    SOFT TISSUES:   -No popliteal cyst. No acute muscular injury or soft tissue mass.       Impression    IMPRESSION:  1.  Complete re-tear of the ACL graft with no evidence of remaining intact fibers.  2.  Small joint effusion.       Medications   sodium chloride 0.9% BOLUS 1,000 mL (0 mLs Intravenous Stopped 9/9/24 1546)   sodium chloride 0.9% BOLUS 1,000 mL (0 mLs Intravenous Stopped 9/9/24 1643)   HYDROcodone-acetaminophen (NORCO) 5-325 MG per tablet 1 tablet (1 tablet Oral $Given 9/9/24 4935)       Assessments & Plan (with Medical Decision Making)     I have reviewed the nursing notes.    I have reviewed the findings, diagnosis, plan and need for follow up with the patient.  Medical Decision Making  Rufus Cordova is a 29 year old male with no relevant medical history who presents for evaluation of left knee pain and a syncopal episode.  Patient had systolic pressures in the 70s for EMS and this seemed to resolve after 500 cc of fluids.  Pressure on arrival here is 98/69.  Vitals otherwise normal.  BP did drop a bit after that down to 90/61.  He appears uncomfortable,  describes significant knee pain.  Patient has moderate diffuse anterior knee pain, but no obvious signs of trauma, effusion, or any overlying skin changes such as erythema or warmth.  I do not appreciate obvious instability of the knee and he has no tenderness whatsoever to the hip or ankle on that side.  The cause of his popping sensation is a bit unclear, could theoretically represent a meniscus or ligamentous injury, but distal neurovascular exam is fully intact.  Will obtain repeat x-ray due to tenderness.  It sounds like his syncopal episode today was triggered by a pain response, though he has persistent hypotension is a little puzzling.  Will administer another liter of fluids and obtain EKG, labs to evaluate for potential infectious source.  He denies having any neurological symptoms, chest pain, and I doubt PE, dysrhythmia/cardiac cause.    Workup was very reassuring overall.  He did have a minor leukocytosis at 13.5, but no anemia, no obvious electrolyte abnormalities.  Inflammatory markers are entirely negative.  X-ray showed stable postoperative changes, a chronic ossicle to the lower patella, and a small knee effusion.  Upon discussing these results, patient still described excruciating pain and refused to move his leg from a position in which it was flexed and externally rotated.  Tenderness is still mostly localized to the patella and proximal tibia, but I do not appreciate any effusion or warmth to touch.  Distal neurovascular exam remains fully intact and he does not have any tenderness whatsoever to the calf or thigh.  I doubt DVT, but he has been a bit bedbound due to pain from the knee injury over the last week.  Initially ordered an ultrasound, but since this would have taken 2 to 3 hours, instead ordered a D-dimer which was negative.  This would make DVT extremely unlikely.  Patient still refused to move the leg or even attempt standing/ambulation despite Norco.  Since I cannot explain this  severe pain based on available imaging, I do think MRI imaging is warranted in an otherwise healthy 29-year-old male who refuses to stand due to pain.    MRI later confirmed complete tear of ACL graft and tiny effusion.  I suspect this is the cause of his right throat pain.  He ultimately was able to stand after oxycodone.  I discussed his case with orthopedics and they agreed with plans for crutches, knee immobilizer, and outpatient follow-up.  Advised Tylenol, ibuprofen, rest/ice/elevation.  He agrees to follow-up as soon as possible and will return sooner for any new or worsening symptoms.    New Prescriptions    OXYCODONE (ROXICODONE) 5 MG TABLET    Take 1 tablet (5 mg) by mouth every 6 hours as needed for pain.     Final diagnoses:   Rupture of anterior cruciate ligament of left knee, initial encounter   Acute pain of left knee     9/9/2024   [facility] EMERGENCY DEPT       Robinson Felipe MD  09/09/24 6039

## 2024-09-09 NOTE — ED TRIAGE NOTES
"Patient brought in by EMS following a syncopal episode after patient tried getting up from bed. Patient's SBP upon arrival was 72, was given IVF and BP 85/60 and 88/62. Blood glucose 88. 18 g left AC. Patient was seen a few days ago for hyperflexed left knee, states this morning his \"knee popped out of place and things went down hill from there\".        "

## 2024-09-09 NOTE — ED NOTES
Patient states can't move his left leg and it hurts. Patient screams out in pain but than seems relaxed.

## 2024-09-10 NOTE — DISCHARGE INSTRUCTIONS
You tore your ACL again today, which I think is the cause of your acute discomfort.  The episode of passing out earlier is probably due to intensity of pain and dehydration.    The rest of your lab tests and x-ray were very reassuring.  Use Tylenol/ibuprofen for discomfort and the oxycodone for breakthrough pain.  Ice and elevate the leg is much as possible.    Wear the knee immobilizer and use crutches until you are seen by orthopedics.  Referral was provided today, make an appointment as soon as possible.    Return to the emergency department sooner for any new or worsening symptoms.

## 2024-09-11 ENCOUNTER — TELEPHONE (OUTPATIENT)
Dept: ORTHOPEDICS | Facility: CLINIC | Age: 29
End: 2024-09-11

## 2024-09-11 NOTE — TELEPHONE ENCOUNTER
Other: FYI patient scheduled for Acute left ACL tear-offered appts w/in 3 days per the [facility] grid, but patient refused and chose appt date 09/27

## 2024-09-11 NOTE — TELEPHONE ENCOUNTER
Refusal to schedule within recommended time frame noted. This was relayed to Dr. Davis's team.     Amy Vaz RN   [facility]

## 2024-09-28 ENCOUNTER — HOSPITAL ENCOUNTER (EMERGENCY)
Facility: CLINIC | Age: 29
Discharge: HOME | End: 2024-09-28
Attending: FAMILY MEDICINE | Admitting: FAMILY MEDICINE

## 2024-09-28 VITALS
SYSTOLIC BLOOD PRESSURE: 115 MMHG | TEMPERATURE: 98.6 F | RESPIRATION RATE: 18 BRPM | OXYGEN SATURATION: 98 % | DIASTOLIC BLOOD PRESSURE: 80 MMHG | HEART RATE: 108 BPM

## 2024-09-28 DIAGNOSIS — S83.512A RUPTURE OF ANTERIOR CRUCIATE LIGAMENT OF LEFT KNEE, INITIAL ENCOUNTER: ICD-10-CM

## 2024-09-28 PROCEDURE — 99283 EMERGENCY DEPT VISIT LOW MDM: CPT | Performed by: FAMILY MEDICINE

## 2024-09-28 RX ORDER — HYDROCODONE BITARTRATE AND ACETAMINOPHEN 5; 325 MG/1; MG/1
1 TABLET ORAL EVERY 6 HOURS PRN
Qty: 6 TABLET | Refills: 0 | Status: SHIPPED | OUTPATIENT
Start: 2024-09-28

## 2024-09-28 ASSESSMENT — ACTIVITIES OF DAILY LIVING (ADL): ADLS_ACUITY_SCORE: 35

## 2024-09-28 NOTE — ED TRIAGE NOTES
Patient having left knee pain and landed wrong on knee.     Triage Assessment (Adult)       Row Name 09/28/24 0148          Triage Assessment    Airway WDL WDL        Respiratory WDL    Respiratory WDL WDL        Peripheral/Neurovascular WDL    Peripheral Neurovascular WDL WDL

## 2024-09-28 NOTE — ED PROVIDER NOTES
History     Chief Complaint   Patient presents with    Knee Injury     HPI  Rufus Cordova is a 29 year old male who presents with continued left knee pain.  Patient was diagnosed recently with an ACL tear.  Patient had been out of his pain meds for the last 2 weeks.  He had an appointment with orthopedics for today but because of car issues missed his appointment.  Patient states that he fell tonight and is having pretty severe pain in his knee.  He is having trouble sleeping and is coming in requesting more pain medications.  His plan is to call on Monday to get back in with orthopedics.  Patient is not wearing a knee immobilizer as he feels like the Ace wrap with a slightly bent is the most comfortable position for him.  He is using crutches with very minimal weightbearing.    Allergies:  No Known Allergies    Problem List:    There are no problems to display for this patient.       Past Medical History:    No past medical history on file.    Past Surgical History:    No past surgical history on file.    Family History:    No family history on file.    Social History:  Marital Status:  Single [1]        Medications:    HYDROcodone-acetaminophen (NORCO) 5-325 MG tablet          Review of Systems   All other systems reviewed and are negative.      Physical Exam   BP: 115/80  Pulse: 108  Temp: 98.6  F (37  C)  Resp: 18  SpO2: 98 %      Physical Exam  Vitals and nursing note reviewed.   Constitutional:       General: He is not in acute distress.     Appearance: Normal appearance. He is not ill-appearing.   Cardiovascular:      Pulses: Normal pulses.   Musculoskeletal:         General: Swelling present.   Skin:     General: Skin is warm and dry.   Neurological:      Mental Status: He is alert.      Sensory: No sensory deficit.         ED Course        Procedures        No results found for this or any previous visit (from the past 24 hour(s)).    Medications - No data to display    I explained to him that we do not  normally refill pain medications out of the emergency department.  I told him I would refill him 1 more time but no one would refill any more narcotics for this condition until he follows up with orthopedics after this visit.  Patient expressed understanding.  Patient was sent home with 6 hydrocodone pills and will be discharged at this time.    Assessments & Plan (with Medical Decision Making)  Left ACL tear     I have reviewed the nursing notes.    I have reviewed the findings, diagnosis, plan and need for follow up with the patient.      New Prescriptions    HYDROCODONE-ACETAMINOPHEN (NORCO) 5-325 MG TABLET    Take 1 tablet by mouth every 6 hours as needed for severe pain.       Final diagnoses:   Rupture of anterior cruciate ligament of left knee, initial encounter       9/28/2024   [facility] EMERGENCY DEPT       Darian Robins MD  09/28/24 0159